# Patient Record
Sex: FEMALE | Race: WHITE | NOT HISPANIC OR LATINO | URBAN - METROPOLITAN AREA
[De-identification: names, ages, dates, MRNs, and addresses within clinical notes are randomized per-mention and may not be internally consistent; named-entity substitution may affect disease eponyms.]

---

## 2017-08-08 PROBLEM — D49.2 NEOPLASM OF UNSPECIFIED BEHAVIOR OF BONE, SOFT TISSUE, AND SKIN: Status: RESOLVED | Noted: 2017-07-25 | Resolved: 2017-08-08

## 2018-02-28 ENCOUNTER — APPOINTMENT (RX ONLY)
Dept: URBAN - METROPOLITAN AREA CLINIC 20 | Facility: CLINIC | Age: 53
Setting detail: DERMATOLOGY
End: 2018-02-28

## 2018-02-28 NOTE — HPI: COSMETIC CONSULTATION
When Outside In The Sun, Do You...: always burns, never tans
Additional History: Katy’s daughter is getting  in September. She is on a limited budget but would like to look better for the wedding. We discussed fraxel, bbl, VI peels and facials. Recommended fraxel 1550 series to soften acne scars. Katy is also concerned w “red spots” on her lower lip, nose and cheek. I recommended fraxel 1550 and then a follow up bbl spot tx $75- for reds. Also suggested she only wash face 1x per day & cut back on scrubs & exfoliant  to 2x per week. \\nPurchased - skinmedica .25 retinol advised she start w 2/3x per wk application in pm after cleansing. \\nCheryl will call after she has reviewed all options. May start w just a facial.